# Patient Record
Sex: MALE | Race: WHITE | URBAN - METROPOLITAN AREA
[De-identification: names, ages, dates, MRNs, and addresses within clinical notes are randomized per-mention and may not be internally consistent; named-entity substitution may affect disease eponyms.]

---
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
## 2025-07-08 ENCOUNTER — OFFICE VISIT (OUTPATIENT)
Dept: INTERNAL MEDICINE CLINIC | Age: 20
End: 2025-07-08
Payer: COMMERCIAL

## 2025-07-08 VITALS
DIASTOLIC BLOOD PRESSURE: 88 MMHG | SYSTOLIC BLOOD PRESSURE: 130 MMHG | HEIGHT: 72 IN | HEART RATE: 83 BPM | BODY MASS INDEX: 36.76 KG/M2 | OXYGEN SATURATION: 97 % | WEIGHT: 271.4 LBS

## 2025-07-08 DIAGNOSIS — R04.0 EPISTAXIS: ICD-10-CM

## 2025-07-08 DIAGNOSIS — Z00.00 ANNUAL PHYSICAL EXAM: Primary | ICD-10-CM

## 2025-07-08 PROBLEM — L74.8 FOOT ODOR: Status: RESOLVED | Noted: 2023-12-22 | Resolved: 2025-07-08

## 2025-07-08 PROCEDURE — 99395 PREV VISIT EST AGE 18-39: CPT | Performed by: NURSE PRACTITIONER

## 2025-07-08 RX ORDER — ECHINACEA PURPUREA EXTRACT 125 MG
1 TABLET ORAL PRN
Qty: 1 EACH | Refills: 3 | Status: SHIPPED | OUTPATIENT
Start: 2025-07-08

## 2025-07-08 SDOH — ECONOMIC STABILITY: FOOD INSECURITY: WITHIN THE PAST 12 MONTHS, THE FOOD YOU BOUGHT JUST DIDN'T LAST AND YOU DIDN'T HAVE MONEY TO GET MORE.: NEVER TRUE

## 2025-07-08 SDOH — ECONOMIC STABILITY: FOOD INSECURITY: WITHIN THE PAST 12 MONTHS, YOU WORRIED THAT YOUR FOOD WOULD RUN OUT BEFORE YOU GOT MONEY TO BUY MORE.: NEVER TRUE

## 2025-07-08 ASSESSMENT — PATIENT HEALTH QUESTIONNAIRE - PHQ9
SUM OF ALL RESPONSES TO PHQ QUESTIONS 1-9: 0
1. LITTLE INTEREST OR PLEASURE IN DOING THINGS: NOT AT ALL
SUM OF ALL RESPONSES TO PHQ QUESTIONS 1-9: 0
SUM OF ALL RESPONSES TO PHQ QUESTIONS 1-9: 0
2. FEELING DOWN, DEPRESSED OR HOPELESS: NOT AT ALL
SUM OF ALL RESPONSES TO PHQ QUESTIONS 1-9: 0

## 2025-07-08 NOTE — PROGRESS NOTES
7/8/25     Chief Complaint   Patient presents with    Epistaxis     Almost every day in the morning when he wakes up his nose starts bleeding X2 months        History of Present Illness  The patient presents for evaluation of epistaxis.    Epistaxis  - The patient reports experiencing daily episodes of epistaxis for the past 2.5 months, localized to the right nostril, with each episode lasting a few seconds.  - There is no associated sinus drainage, otalgia, or pressure.  - Symptoms are exacerbated in the mornings.    Allergic Rhinitis  - The patient has a history of seasonal allergic rhinitis, which is new for him this season.   - The patient manages allergic symptoms with generic cetirizine (Zyrtec) but does not use nasal corticosteroid sprays.    SOCIAL HISTORY  - Works at [employer]    No Known Allergies    Current Outpatient Medications   Medication Sig Dispense Refill    sodium chloride ([employer] SALINE NASAL SPRAY) 0.65 % nasal spray 1 spray by Nasal route as needed for Congestion 1 each 3     No current facility-administered medications for this visit.     Review of Systems  Negative other than HPI   Results  - Labs:    - Labs done 1.5 years ago were normal       Vitals:    07/08/25 1454   BP: 130/88   BP Site: Right Upper Arm   Patient Position: Sitting   BP Cuff Size: Medium Adult   Pulse: 83   SpO2: 97%   Weight: 123.1 kg (271 lb 6.4 oz)   Height: 1.829 m (6')      Physical Exam  Constitutional:       General: He is not in acute distress.     Appearance: Normal appearance. He is not ill-appearing.   HENT:      Head: Normocephalic and atraumatic.      Right Ear: Tympanic membrane and ear canal normal.      Left Ear: Tympanic membrane and ear canal normal.      Nose: No nasal deformity or nasal tenderness.      Right Nostril: No epistaxis.      Left Nostril: No epistaxis.      Right Sinus: No maxillary sinus tenderness or frontal sinus tenderness.      Left Sinus: No maxillary sinus